# Patient Record
Sex: FEMALE | Race: WHITE | Employment: UNEMPLOYED | URBAN - METROPOLITAN AREA
[De-identification: names, ages, dates, MRNs, and addresses within clinical notes are randomized per-mention and may not be internally consistent; named-entity substitution may affect disease eponyms.]

---

## 2018-01-18 ENCOUNTER — HOSPITAL ENCOUNTER (OUTPATIENT)
Dept: GENERAL RADIOLOGY | Facility: CLINIC | Age: 48
Discharge: HOME | End: 2018-01-18
Attending: INTERNAL MEDICINE | Admitting: INTERNAL MEDICINE
Payer: COMMERCIAL

## 2018-01-18 DIAGNOSIS — R09.A2 GLOBUS SENSATION: ICD-10-CM

## 2018-01-18 PROCEDURE — 25500045 ZZH RX 255: Performed by: INTERNAL MEDICINE

## 2018-01-18 PROCEDURE — 74220 X-RAY XM ESOPHAGUS 1CNTRST: CPT

## 2018-01-18 RX ADMIN — ANTACID/ANTIFLATULENT 4 G: 380; 550; 10; 10 GRANULE, EFFERVESCENT ORAL at 10:46

## 2018-11-12 ENCOUNTER — APPOINTMENT (OUTPATIENT)
Dept: GENERAL RADIOLOGY | Facility: CLINIC | Age: 48
End: 2018-11-12
Attending: NURSE PRACTITIONER
Payer: COMMERCIAL

## 2018-11-12 ENCOUNTER — HOSPITAL ENCOUNTER (EMERGENCY)
Facility: CLINIC | Age: 48
Discharge: HOME | End: 2018-11-12
Attending: NURSE PRACTITIONER | Admitting: NURSE PRACTITIONER
Payer: COMMERCIAL

## 2018-11-12 VITALS
WEIGHT: 190 LBS | DIASTOLIC BLOOD PRESSURE: 86 MMHG | TEMPERATURE: 97.5 F | SYSTOLIC BLOOD PRESSURE: 125 MMHG | OXYGEN SATURATION: 93 % | HEART RATE: 98 BPM | RESPIRATION RATE: 18 BRPM

## 2018-11-12 DIAGNOSIS — R23.2 HOT FLASHES: ICD-10-CM

## 2018-11-12 LAB
ALBUMIN SERPL-MCNC: 4.2 G/DL (ref 3.4–5)
ALBUMIN UR-MCNC: NEGATIVE MG/DL
ALP SERPL-CCNC: 35 U/L (ref 40–150)
ALT SERPL W P-5'-P-CCNC: 28 U/L (ref 0–50)
ANION GAP SERPL CALCULATED.3IONS-SCNC: 8 MMOL/L (ref 3–14)
APPEARANCE UR: CLEAR
AST SERPL W P-5'-P-CCNC: 24 U/L (ref 0–45)
BASOPHILS # BLD AUTO: 0 10E9/L (ref 0–0.2)
BASOPHILS NFR BLD AUTO: 0.6 %
BILIRUB SERPL-MCNC: 0.4 MG/DL (ref 0.2–1.3)
BILIRUB UR QL STRIP: NEGATIVE
BUN SERPL-MCNC: 21 MG/DL (ref 7–30)
CALCIUM SERPL-MCNC: 9.4 MG/DL (ref 8.5–10.1)
CHLORIDE SERPL-SCNC: 104 MMOL/L (ref 94–109)
CO2 SERPL-SCNC: 26 MMOL/L (ref 20–32)
COLOR UR AUTO: YELLOW
CREAT SERPL-MCNC: 0.73 MG/DL (ref 0.52–1.04)
DIFFERENTIAL METHOD BLD: NORMAL
EOSINOPHIL # BLD AUTO: 0 10E9/L (ref 0–0.7)
EOSINOPHIL NFR BLD AUTO: 0.3 %
ERYTHROCYTE [DISTWIDTH] IN BLOOD BY AUTOMATED COUNT: 13.2 % (ref 10–15)
GFR SERPL CREATININE-BSD FRML MDRD: 84 ML/MIN/1.7M2
GLUCOSE SERPL-MCNC: 148 MG/DL (ref 70–99)
GLUCOSE UR STRIP-MCNC: NEGATIVE MG/DL
HCT VFR BLD AUTO: 40 % (ref 35–47)
HGB BLD-MCNC: 13.3 G/DL (ref 11.7–15.7)
HGB UR QL STRIP: NEGATIVE
IMM GRANULOCYTES # BLD: 0 10E9/L (ref 0–0.4)
IMM GRANULOCYTES NFR BLD: 0.3 %
KETONES UR STRIP-MCNC: 5 MG/DL
LEUKOCYTE ESTERASE UR QL STRIP: NEGATIVE
LIPASE SERPL-CCNC: 175 U/L (ref 73–393)
LYMPHOCYTES # BLD AUTO: 1.4 10E9/L (ref 0.8–5.3)
LYMPHOCYTES NFR BLD AUTO: 19.9 %
MCH RBC QN AUTO: 30.6 PG (ref 26.5–33)
MCHC RBC AUTO-ENTMCNC: 33.3 G/DL (ref 31.5–36.5)
MCV RBC AUTO: 92 FL (ref 78–100)
MONOCYTES # BLD AUTO: 0.3 10E9/L (ref 0–1.3)
MONOCYTES NFR BLD AUTO: 4.1 %
MUCOUS THREADS #/AREA URNS LPF: PRESENT /LPF
NEUTROPHILS # BLD AUTO: 5.2 10E9/L (ref 1.6–8.3)
NEUTROPHILS NFR BLD AUTO: 74.8 %
NITRATE UR QL: NEGATIVE
NRBC # BLD AUTO: 0 10*3/UL
NRBC BLD AUTO-RTO: 0 /100
PH UR STRIP: 5 PH (ref 5–7)
PLATELET # BLD AUTO: 222 10E9/L (ref 150–450)
POTASSIUM SERPL-SCNC: 4.2 MMOL/L (ref 3.4–5.3)
PROT SERPL-MCNC: 7.4 G/DL (ref 6.8–8.8)
RBC # BLD AUTO: 4.34 10E12/L (ref 3.8–5.2)
RBC #/AREA URNS AUTO: 0 /HPF (ref 0–2)
SODIUM SERPL-SCNC: 138 MMOL/L (ref 133–144)
SOURCE: ABNORMAL
SP GR UR STRIP: 1.02 (ref 1–1.03)
TROPONIN I SERPL-MCNC: <0.015 UG/L (ref 0–0.04)
UROBILINOGEN UR STRIP-MCNC: 0 MG/DL (ref 0–2)
WBC # BLD AUTO: 6.9 10E9/L (ref 4–11)
WBC #/AREA URNS AUTO: <1 /HPF (ref 0–5)

## 2018-11-12 PROCEDURE — 99285 EMERGENCY DEPT VISIT HI MDM: CPT | Mod: 25

## 2018-11-12 PROCEDURE — 83690 ASSAY OF LIPASE: CPT | Performed by: NURSE PRACTITIONER

## 2018-11-12 PROCEDURE — 84484 ASSAY OF TROPONIN QUANT: CPT | Performed by: NURSE PRACTITIONER

## 2018-11-12 PROCEDURE — 80053 COMPREHEN METABOLIC PANEL: CPT | Performed by: NURSE PRACTITIONER

## 2018-11-12 PROCEDURE — 25000128 H RX IP 250 OP 636: Performed by: NURSE PRACTITIONER

## 2018-11-12 PROCEDURE — 93005 ELECTROCARDIOGRAM TRACING: CPT

## 2018-11-12 PROCEDURE — 25000125 ZZHC RX 250: Performed by: NURSE PRACTITIONER

## 2018-11-12 PROCEDURE — 25000132 ZZH RX MED GY IP 250 OP 250 PS 637: Performed by: NURSE PRACTITIONER

## 2018-11-12 PROCEDURE — 71046 X-RAY EXAM CHEST 2 VIEWS: CPT

## 2018-11-12 PROCEDURE — 96374 THER/PROPH/DIAG INJ IV PUSH: CPT

## 2018-11-12 PROCEDURE — 85025 COMPLETE CBC W/AUTO DIFF WBC: CPT | Performed by: NURSE PRACTITIONER

## 2018-11-12 PROCEDURE — 81001 URINALYSIS AUTO W/SCOPE: CPT | Performed by: NURSE PRACTITIONER

## 2018-11-12 RX ORDER — ONDANSETRON 2 MG/ML
4 INJECTION INTRAMUSCULAR; INTRAVENOUS EVERY 30 MIN PRN
Status: DISCONTINUED | OUTPATIENT
Start: 2018-11-12 | End: 2018-11-12 | Stop reason: HOSPADM

## 2018-11-12 RX ADMIN — SODIUM CHLORIDE 1000 ML: 9 INJECTION, SOLUTION INTRAVENOUS at 16:46

## 2018-11-12 RX ADMIN — ONDANSETRON 4 MG: 2 INJECTION INTRAMUSCULAR; INTRAVENOUS at 16:46

## 2018-11-12 RX ADMIN — LIDOCAINE HYDROCHLORIDE 30 ML: 20 SOLUTION ORAL; TOPICAL at 16:48

## 2018-11-12 ASSESSMENT — ENCOUNTER SYMPTOMS
SHORTNESS OF BREATH: 0
NERVOUS/ANXIOUS: 1
NAUSEA: 1
PALPITATIONS: 1
LIGHT-HEADEDNESS: 1
FEVER: 1

## 2018-11-12 NOTE — ED TRIAGE NOTES
48-year-old female presents to the ER with complaints of extreme hot flashes. Was recently dx with DM and had a blood sugar of 160. Pt states she isn't wanting to go on insulin. Appears anxious and had an episode at triage when she hyperventilated. Pt denies anxiety and panic attacks.

## 2018-11-12 NOTE — ED AVS SNAPSHOT
[facility]    [address]    [facility] [address]    Phone:  [phone]    Fax:  [phone]                                       Cely Gastelum   MRN: [id number]    Department:  [facility]   Date of Visit:  11/12/2018           After Visit Summary Signature Page     I have received my discharge instructions, and my questions have been answered. I have discussed any challenges I see with this plan with the nurse or doctor.    ..........................................................................................................................................  Patient/Patient Representative Signature      ..........................................................................................................................................  Patient Representative Print Name and Relationship to Patient    ..................................................               ................................................  Date                                   Time    ..........................................................................................................................................  Reviewed by Signature/Title    ...................................................              ..............................................  Date                                               Time          22EPIC Rev 08/18

## 2018-11-12 NOTE — DISCHARGE INSTRUCTIONS
Call tomorrow to speak with hormonal speciatlist MD as well as primary care doctor.  Drink plenty of fluids.  Return to ED with any new and troublesome symptoms including worsening symptoms, chest pain, weakness, etc.

## 2018-11-12 NOTE — ED NOTES
"Pt refused medications upon ordering, stating that she \"wanted us to see what (she) was experiencing.\"  Discussed with patient that if episodes happened again, we would try the medications to see if they decreased the frequency of episodes.  Pt agreed to meds, reporting that she has been timing the episodes and they are happening approx every 30 minutes.    "

## 2018-11-12 NOTE — ED AVS SNAPSHOT
[facility] Department    [address]    [facility] [address]    Phone:  [phone]    Fax:  [phone]                                       Cely Gastelum   MRN: [id number]    Department:  [facility] Emergency Department   Date of Visit:  11/12/2018           Patient Information     Date Of Birth          1970        Your diagnoses for this visit were:     Hot flashes        You were seen by Jazlyn Malagon, PATRICE CNP.      Follow-up Information     Follow up with Clinic, [facility] In 1 day.    Contact information:    [address] [facility] [address] [phone]          Follow up with [facility] Emergency [facility].    Specialty:  EMERGENCY MEDICINE    Why:  As needed, If symptoms worsen    Contact information:    [address]  [facility] [address]  [phone]        Discharge Instructions       Call tomorrow to speak with hormonal speciatlist MD as well as primary care doctor.  Drink plenty of fluids.  Return to ED with any new and troublesome symptoms including worsening symptoms, chest pain, weakness, etc.    24 Hour Appointment Hotline       To make an appointment at any [facility] clinic, call [phone] ([phone]). If you don't have a family doctor or clinic, we will help you find one. [facility] clinics are conveniently located to serve the needs of you and your family.             Review of your medicines      Notice     You have not been prescribed any medications.            Procedures and tests performed during your visit     CBC with platelets differential    Comprehensive metabolic panel    EKG 12-lead, tracing only    Lipase    Troponin I    UA with Microscopic    XR Chest 2 Views      Orders Needing Specimen Collection     None      Pending Results     Date and Time Order Name Status Description    11/12/2018 1459 EKG 12-lead, tracing only Preliminary             Pending Culture  Results     No orders found from 11/10/2018 to 11/13/2018.            Pending Results Instructions     If you had any lab results that were not finalized at the time of your Discharge, you can call the ED Lab Result RN at [phone]. You will be contacted by this team for any positive Lab results or changes in treatment. The nurses are available 7 days a week from 10A to 6:30P.  You can leave a message 24 hours per day and they will return your call.        Test Results From Your Hospital Stay        11/12/2018  3:39 PM      Component Results     Component Value Ref Range & Units Status    Troponin I ES <0.015 0.000 - 0.045 ug/L Final    The 99th percentile for upper reference range is 0.045 ug/L.  Troponin values   in the range of 0.045 - 0.120 ug/L may be associated with risks of adverse   clinical events.           11/12/2018  3:19 PM      Component Results     Component Value Ref Range & Units Status    WBC 6.9 4.0 - 11.0 10e9/L Final    RBC Count 4.34 3.8 - 5.2 10e12/L Final    Hemoglobin 13.3 11.7 - 15.7 g/dL Final    Hematocrit 40.0 35.0 - 47.0 % Final    MCV 92 78 - 100 fl Final    MCH 30.6 26.5 - 33.0 pg Final    MCHC 33.3 31.5 - 36.5 g/dL Final    RDW 13.2 10.0 - 15.0 % Final    Platelet Count 222 150 - 450 10e9/L Final    Diff Method Automated Method  Final    % Neutrophils 74.8 % Final    % Lymphocytes 19.9 % Final    % Monocytes 4.1 % Final    % Eosinophils 0.3 % Final    % Basophils 0.6 % Final    % Immature Granulocytes 0.3 % Final    Nucleated RBCs 0 0 /100 Final    Absolute Neutrophil 5.2 1.6 - 8.3 10e9/L Final    Absolute Lymphocytes 1.4 0.8 - 5.3 10e9/L Final    Absolute Monocytes 0.3 0.0 - 1.3 10e9/L Final    Absolute Eosinophils 0.0 0.0 - 0.7 10e9/L Final    Absolute Basophils 0.0 0.0 - 0.2 10e9/L Final    Abs Immature Granulocytes 0.0 0 - 0.4 10e9/L Final    Absolute Nucleated RBC 0.0  Final         11/12/2018  3:39 PM      Component Results     Component Value Ref Range & Units Status     Sodium 138 133 - 144 mmol/L Final    Potassium 4.2 3.4 - 5.3 mmol/L Final    Chloride 104 94 - 109 mmol/L Final    Carbon Dioxide 26 20 - 32 mmol/L Final    Anion Gap 8 3 - 14 mmol/L Final    Glucose 148 (H) 70 - 99 mg/dL Final    Urea Nitrogen 21 7 - 30 mg/dL Final    Creatinine 0.73 0.52 - 1.04 mg/dL Final    GFR Estimate 84 >60 mL/min/1.7m2 Final    Non  GFR Calc    GFR Estimate If Black >90 >60 mL/min/1.7m2 Final    African American GFR Calc    Calcium 9.4 8.5 - 10.1 mg/dL Final    Bilirubin Total 0.4 0.2 - 1.3 mg/dL Final    Albumin 4.2 3.4 - 5.0 g/dL Final    Protein Total 7.4 6.8 - 8.8 g/dL Final    Alkaline Phosphatase 35 (L) 40 - 150 U/L Final    ALT 28 0 - 50 U/L Final    AST 24 0 - 45 U/L Final         11/12/2018  3:39 PM      Component Results     Component Value Ref Range & Units Status    Lipase 175 73 - 393 U/L Final         11/12/2018  4:49 PM      Narrative     XR CHEST 2 VW 11/12/2018 4:42 PM     HISTORY: chest pain;     COMPARISON: None        Impression     IMPRESSION: The cardiac silhouette and pulmonary vasculature are  within normal limits. There is an ASD closure device in the heart. No  evidence of pneumothorax or pleural effusion. The lungs are clear.    BIJAN FITZPATRICK MD         11/12/2018  5:14 PM      Component Results     Component Value Ref Range & Units Status    Color Urine Yellow  Final    Appearance Urine Clear  Final    Glucose Urine Negative NEG^Negative mg/dL Final    Bilirubin Urine Negative NEG^Negative Final    Ketones Urine 5 (A) NEG^Negative mg/dL Final    Specific Gravity Urine 1.020 1.003 - 1.035 Final    Blood Urine Negative NEG^Negative Final    pH Urine 5.0 5.0 - 7.0 pH Final    Protein Albumin Urine Negative NEG^Negative mg/dL Final    Urobilinogen mg/dL 0.0 0.0 - 2.0 mg/dL Final    Nitrite Urine Negative NEG^Negative Final    Leukocyte Esterase Urine Negative NEG^Negative Final    Source Midstream Urine  Final    WBC Urine <1 0 - 5 /HPF Final    RBC  Urine 0 0 - 2 /HPF Final    Mucous Urine Present (A) NEG^Negative /LPF Final                Clinical Quality Measure: Blood Pressure Screening     Your blood pressure was checked while you were in the emergency department today. The last reading we obtained was  BP: (!) 169/102 . Please read the guidelines below about what these numbers mean and what you should do about them.  If your systolic blood pressure (the top number) is less than 120 and your diastolic blood pressure (the bottom number) is less than 80, then your blood pressure is normal. There is nothing more that you need to do about it.  If your systolic blood pressure (the top number) is 120-139 or your diastolic blood pressure (the bottom number) is 80-89, your blood pressure may be higher than it should be. You should have your blood pressure rechecked within a year by a primary care provider.  If your systolic blood pressure (the top number) is 140 or greater or your diastolic blood pressure (the bottom number) is 90 or greater, you may have high blood pressure. High blood pressure is treatable, but if left untreated over time it can put you at risk for heart attack, stroke, or kidney failure. You should have your blood pressure rechecked by a primary care provider within the next 4 weeks.  If your provider in the emergency department today gave you specific instructions to follow-up with your doctor or provider even sooner than that, you should follow that instruction and not wait for up to 4 weeks for your follow-up visit.        Thank you for choosing [facility]       Thank you for choosing [facility] for your care. Our goal is always to provide you with excellent care. Hearing back from our patients is one way we can continue to improve our services. Please take a few minutes to complete the written survey that you may receive in the mail after you visit with us. Thank you!        [facility] Information     [employer] lets you send messages to your doctor,  "view your test results, renew your prescriptions, schedule appointments and more. To sign up, go to [email or web address] . Click on \"Log in\" on the left side of the screen, which will take you to the Welcome page. Then click on \"Sign up Now\" on the right side of the page.     You will be asked to enter the access code listed below, as well as some personal information. Please follow the directions to create your username and password.     Your access code is: [email or web address]  Expires: 2/10/2019  5:37 PM     Your access code will  in 90 days. If you need help or a new code, please call your [facility] clinic or [phone].        Care EveryWhere ID     This is your Care EveryWhere ID. This could be used by other organizations to access your [facility] medical records  [id number]        Equal Access to Services     [facility] Center[facility] : Hadmeri Baker, wacandelaria gonzalez, qaerwin taveraaljackie white, marco baldwin . So [facility] [phone].    ATENCIÓN: Si habla español, tiene a dennis disposición servicios gratuitos de asistencia lingüística. Llame al [phone].    We comply with applicable federal civil rights laws and [place] laws. We do not discriminate on the basis of race, color, national origin, age, disability, sex, sexual orientation, or gender identity.            After Visit Summary       This is your record. Keep this with you and show to your community pharmacist(s) and doctor(s) at your next visit.                  "

## 2018-11-12 NOTE — ED PROVIDER NOTES
"  History     Chief Complaint:  Anxiety    HPI   Cely Gastelum is a 48 year old female with a history of diabetes who presents to the emergency department for evaluation of anxiety and \"extreme hot flashes.\" This morning, she woke up feeling better than normal, but when she went to eat her breakfast of 1 egg and 1 piece of toast, she was overcome by nausea and heat, and had to spit out her food. Since that initial episode, she reports having extreme hot flashes that feel like a \"vengeful fever\" every 30-45 minutes. These episodes are often followed by seconds of confusion or memory loss. They are only accompanied by nausea when she tries to eat something, however she does report that she was able to keep a banana down. On the way to the ED, she reports that she forgot where she was going, however she denies being in the middle of one of the hot flashes at that time. Her most recent episode was while she was in the waiting room in the ED. Here, she denies any chest pain or shortness of breath. She does report lightheadedness and subjective palpitations. She reports a history of CVAs and a heart surgery but states with CVAs she experienced double vision and after ASD was closed, she has had no further CVAs. She denies being on blood thinners, even thought she is supposed to take an aspirin daily.  She has recently been undergoing a workup with a hormone specialist who did recently start her on new vitamins as well as other supplementations.  She is perimenopausal.    Allergies:  NSAIDs  Penicillin    Medications:    The patient is currently on no regular medications.    Past Medical History:    CVAs x3  Diabetes    Past Surgical History:    Heart surgery    Family History:    No past pertinent family history.    Social History:  Marital Status:   [2]  Presents alone. Drove self.      Review of Systems   Constitutional: Positive for fever (subjective).   Respiratory: Negative for shortness of breath.  "   Cardiovascular: Positive for palpitations. Negative for chest pain.   Gastrointestinal: Positive for nausea.   Neurological: Positive for light-headedness.   Psychiatric/Behavioral: The patient is nervous/anxious.      Physical Exam     Patient Vitals for the past 24 hrs:   BP Temp Temp src Pulse Heart Rate Resp SpO2 Weight   11/12/18 1659 (!) 169/102 - - - - - 97 % -   11/12/18 1630 (!) 146/102 - - - 87 - 98 % -   11/12/18 1615 (!) 148/99 - - - 85 - 95 % -   11/12/18 1600 (!) 144/107 - - - 84 - 91 % -   11/12/18 1545 (!) 148/97 - - - 82 - - -   11/12/18 1530 (!) 129/93 - - - - - 93 % -   11/12/18 1515 (!) 132/98 - - - - - 92 % -   11/12/18 1500 (!) 156/107 - - - - - 93 % -   11/12/18 1445 (!) 136/98 - - - - - 94 % -   11/12/18 1427 (!) 144/113 97.5  F (36.4  C) Temporal 98 - 18 98 % 86.2 kg (190 lb)     Physical Exam  Constitutional: Alert, attentive, GCS 15, in no apparent distress.    HENT: Mucous membranes are moist.   Eyes: EOM are normal. PERRLA. Conjunctiva pink, no scleral icterus or conjunctival injection  CV: regular rate and rhythm; no murmurs, rubs or gallups.  Radial, dorsalis pedis and posterior tibial pulses 2+ bilaterally.  Cap refill <2 seconds.  Respiratory: Effort normal. Lungs clear to auscultation bilaterally. No crackles/rubs/wheezes.  Good air movement.  GI:  Mild epigastric tenderness; no rebound or guarding. No distension. Normal bowel sounds.  MSK: Normal range of motion. No peripheral edema or calf tenderness.  Neurological: Alert and oriented to person/place/time.  Intact recent and remote memory, judgment and insight, normal mood and affect.  No aphasia/facial droop/dysarthria, tongue midline, symmetric palatal elevation, normal strength at SCM/trapezius/BUE/BLE, normal coordination to FNF at BUE and heel-to-shin at BLE, normal casual gait,  no pronator drift, sensation intact to LT over face/BUE/BLE  Skin: Skin is warm and dry.  No rashes or petechiae.  Psychiatric:  anxious.    Emergency Department Course   ECG:  Indication: Anxiety  Time: 1508  Vent. Rate 79 bpm. MA interval 152. QRS duration 90. QT/QTc 418/479. P-R-T axis 43 -14 20.  Normal sinus rhythm. Normal ECG. Read time: 1512 by Dr. Anthony.     Imaging:  Radiographic findings were communicated with the patient who voiced understanding of the findings.    XR Chest 2 views:   The cardiac silhouette and pulmonary vasculature are  within normal limits. There is an ASD closure device in the heart. No  evidence of pneumothorax or pleural effusion. The lungs are clear, As per radiology.     Laboratory:  CBC: WBC: 6.9, HGB: 13.3, PLT: 222    CMP: Glucose 148 (H), Alkphos: 35 (L), o/w WNL (Creatinine: 0.73)    Lipase: 175    1509 Troponin: <0.015    UA with Microscopic: Ketone: 5 (A), Mucous: Present (A), o/w WNL    Interventions:  1646 Zofran, 4 mg, IV injection   NS 1L IV  1648 GI Cocktail (Maalox/Mylanta and viscous Lidocaine), 30 mL suspension, PO     Emergency Department Course:  Nursing notes and vitals reviewed. (2130) I performed an exam of the patient as documented above.      IV inserted. Medicine administered as documented above. Blood drawn. This was sent to the lab for further testing, results above.     The patient was sent for a chest x-ray while in the emergency department, findings above.      EKG obtained in the ED, see results above.     The patient provided a urine sample here in the emergency department. This was sent for laboratory testing, findings above.     (7829) I rechecked the patient and discussed the results of her workup thus far.      Findings and plan explained to the Patient. Patient discharged home with instructions regarding supportive care, medications, and reasons to return. The importance of close follow-up was reviewed. The patient was prescribed no medications.      I personally reviewed the laboratory and imaging results with the Patient and answered all related questions prior to  discharge.     Impression & Plan      Medical Decision Making:  Cely Gastelum is a 48 year old female who presents for evaluation of hot flashes as detailed above.  A broad differential was considered.  There is no evidence of ACS or dangerous arrhythmia.  There is no evidence of intra-abdominal catastrophe.  Blood work is reassuring.  Chest x-ray was reviewed without any acute findings.  She has a completely nonfocal neurologic exam and I doubt central cause of symptoms therefore no MRI ordered from the ED.  Symptoms could be related to recent medication changes as well as menopause. She was encouraged to follow-up with PCP and specialist for further management tomorrow.  All questions answered prior to discharge.  Strict return precautions to ED with any changing symptoms.    Diagnosis:    ICD-10-CM    1. Hot flashes R23.2        Disposition:  discharged to home    Scribe Disclosure:  I, Letty Salgado, am serving as a scribe on 11/12/2018 at 2:46 PM to personally document services performed by Jazlyn Malagon APRN, CNP based on my observations and the provider's statements to me.     Letty Salgado  11/12/2018   [facility] EMERGENCY DEPARTMENT       Jazlyn Malagon APRN CNP  11/12/18 0277

## 2018-11-13 LAB — INTERPRETATION ECG - MUSE: NORMAL

## 2020-11-30 ENCOUNTER — APPOINTMENT (OUTPATIENT)
Dept: ULTRASOUND IMAGING | Facility: CLINIC | Age: 50
End: 2020-11-30
Attending: EMERGENCY MEDICINE
Payer: COMMERCIAL

## 2020-11-30 ENCOUNTER — APPOINTMENT (OUTPATIENT)
Dept: GENERAL RADIOLOGY | Facility: CLINIC | Age: 50
End: 2020-11-30
Attending: EMERGENCY MEDICINE
Payer: COMMERCIAL

## 2020-11-30 ENCOUNTER — HOSPITAL ENCOUNTER (EMERGENCY)
Facility: CLINIC | Age: 50
Discharge: HOME | End: 2020-11-30
Attending: EMERGENCY MEDICINE | Admitting: EMERGENCY MEDICINE
Payer: COMMERCIAL

## 2020-11-30 VITALS
BODY MASS INDEX: 31.89 KG/M2 | RESPIRATION RATE: 26 BRPM | TEMPERATURE: 97.9 F | DIASTOLIC BLOOD PRESSURE: 109 MMHG | WEIGHT: 180 LBS | SYSTOLIC BLOOD PRESSURE: 160 MMHG | OXYGEN SATURATION: 95 % | HEIGHT: 63 IN | HEART RATE: 85 BPM

## 2020-11-30 DIAGNOSIS — R03.0 ELEVATED BP WITHOUT DIAGNOSIS OF HYPERTENSION: ICD-10-CM

## 2020-11-30 DIAGNOSIS — R06.02 SHORTNESS OF BREATH: ICD-10-CM

## 2020-11-30 LAB
ANION GAP SERPL CALCULATED.3IONS-SCNC: 1 MMOL/L (ref 3–14)
BASOPHILS # BLD AUTO: 0.1 10E9/L (ref 0–0.2)
BASOPHILS NFR BLD AUTO: 0.7 %
BUN SERPL-MCNC: 24 MG/DL (ref 7–30)
CALCIUM SERPL-MCNC: 9.4 MG/DL (ref 8.5–10.1)
CHLORIDE SERPL-SCNC: 106 MMOL/L (ref 94–109)
CO2 SERPL-SCNC: 30 MMOL/L (ref 20–32)
CREAT SERPL-MCNC: 0.91 MG/DL (ref 0.52–1.04)
D DIMER PPP FEU-MCNC: 0.5 UG/ML FEU (ref 0–0.5)
DIFFERENTIAL METHOD BLD: NORMAL
EOSINOPHIL # BLD AUTO: 0.3 10E9/L (ref 0–0.7)
EOSINOPHIL NFR BLD AUTO: 3.2 %
ERYTHROCYTE [DISTWIDTH] IN BLOOD BY AUTOMATED COUNT: 11.8 % (ref 10–15)
GFR SERPL CREATININE-BSD FRML MDRD: 73 ML/MIN/{1.73_M2}
GLUCOSE SERPL-MCNC: 113 MG/DL (ref 70–99)
HCT VFR BLD AUTO: 40.4 % (ref 35–47)
HGB BLD-MCNC: 13.4 G/DL (ref 11.7–15.7)
IMM GRANULOCYTES # BLD: 0 10E9/L (ref 0–0.4)
IMM GRANULOCYTES NFR BLD: 0.4 %
LYMPHOCYTES # BLD AUTO: 2.4 10E9/L (ref 0.8–5.3)
LYMPHOCYTES NFR BLD AUTO: 28.6 %
MCH RBC QN AUTO: 32.1 PG (ref 26.5–33)
MCHC RBC AUTO-ENTMCNC: 33.2 G/DL (ref 31.5–36.5)
MCV RBC AUTO: 97 FL (ref 78–100)
MONOCYTES # BLD AUTO: 0.5 10E9/L (ref 0–1.3)
MONOCYTES NFR BLD AUTO: 5.3 %
NEUTROPHILS # BLD AUTO: 5.2 10E9/L (ref 1.6–8.3)
NEUTROPHILS NFR BLD AUTO: 61.8 %
NRBC # BLD AUTO: 0 10*3/UL
NRBC BLD AUTO-RTO: 0 /100
NT-PROBNP SERPL-MCNC: 96 PG/ML (ref 0–900)
PLATELET # BLD AUTO: 233 10E9/L (ref 150–450)
POTASSIUM SERPL-SCNC: 4.3 MMOL/L (ref 3.4–5.3)
RBC # BLD AUTO: 4.17 10E12/L (ref 3.8–5.2)
SODIUM SERPL-SCNC: 137 MMOL/L (ref 133–144)
TROPONIN I SERPL-MCNC: <0.015 UG/L (ref 0–0.04)
WBC # BLD AUTO: 8.4 10E9/L (ref 4–11)

## 2020-11-30 PROCEDURE — 93970 EXTREMITY STUDY: CPT

## 2020-11-30 PROCEDURE — 83880 ASSAY OF NATRIURETIC PEPTIDE: CPT | Performed by: EMERGENCY MEDICINE

## 2020-11-30 PROCEDURE — 99285 EMERGENCY DEPT VISIT HI MDM: CPT | Mod: 25

## 2020-11-30 PROCEDURE — 85379 FIBRIN DEGRADATION QUANT: CPT | Performed by: EMERGENCY MEDICINE

## 2020-11-30 PROCEDURE — 99284 EMERGENCY DEPT VISIT MOD MDM: CPT | Mod: 25

## 2020-11-30 PROCEDURE — 84484 ASSAY OF TROPONIN QUANT: CPT | Performed by: EMERGENCY MEDICINE

## 2020-11-30 PROCEDURE — 71045 X-RAY EXAM CHEST 1 VIEW: CPT

## 2020-11-30 PROCEDURE — 80048 BASIC METABOLIC PNL TOTAL CA: CPT | Performed by: EMERGENCY MEDICINE

## 2020-11-30 PROCEDURE — 93005 ELECTROCARDIOGRAM TRACING: CPT

## 2020-11-30 PROCEDURE — 85025 COMPLETE CBC W/AUTO DIFF WBC: CPT | Performed by: EMERGENCY MEDICINE

## 2020-11-30 RX ORDER — AMLODIPINE BESYLATE 5 MG/1
2.5 TABLET ORAL DAILY
Qty: 30 TABLET | Refills: 0 | Status: SHIPPED | OUTPATIENT
Start: 2020-11-30

## 2020-11-30 RX ORDER — ESTRADIOL 0.5 MG/1
TABLET ORAL
COMMUNITY
Start: 2020-08-23

## 2020-11-30 RX ORDER — ALBUTEROL SULFATE 90 UG/1
2 AEROSOL, METERED RESPIRATORY (INHALATION) EVERY 6 HOURS PRN
Qty: 1 INHALER | Refills: 0 | Status: SHIPPED | OUTPATIENT
Start: 2020-11-30

## 2020-11-30 ASSESSMENT — ENCOUNTER SYMPTOMS
SHORTNESS OF BREATH: 1
CHEST TIGHTNESS: 1
FEVER: 0
RHINORRHEA: 0
SORE THROAT: 0
CHILLS: 0
COUGH: 0
MYALGIAS: 0

## 2020-11-30 ASSESSMENT — MIFFLIN-ST. JEOR: SCORE: 1395.6

## 2020-11-30 NOTE — ED NOTES
Patient discharged home with discharge paperwork and printed prescriptions. Vital signs stable at discharge. Education provided regarding medication use, changing positions slowly, inhaler use, to follow up with PCP and when to return to ED. Pt verbalized understanding. IV removed. Catheter intact. All questions answered.

## 2020-11-30 NOTE — ED AVS SNAPSHOT
[facility] Emergency Dept  [address]  [facility] [address]  Phone: [phone]  Fax: [phone]                                    Cely Gastelum   MRN: [id number]    Department: [facility] Emergency Dept   Date of Visit: 11/30/2020           After Visit Summary Signature Page    I have received my discharge instructions, and my questions have been answered. I have discussed any challenges I see with this plan with the nurse or doctor.    ..........................................................................................................................................  Patient/Patient Representative Signature      ..........................................................................................................................................  Patient Representative Print Name and Relationship to Patient    ..................................................               ................................................  Date                                   Time    ..........................................................................................................................................  Reviewed by Signature/Title    ...................................................              ..............................................  Date                                               Time          22EPIC Rev 08/18

## 2020-11-30 NOTE — ED TRIAGE NOTES
Chest tightness/pressure since Saturday.  Shortness of breath and chest congestion/coughalso since then.  BP elevated at home.

## 2020-11-30 NOTE — ED PROVIDER NOTES
"  History     Chief Complaint:  Shortness of Breath      HPI   Cely Gastelum is a 50 year old female with history of type II diabetes, migraines, Cryptogenic Stroke, who presents for evaluation of shortness of breath. The patient reports that 2 nights ago she started to notice shortness of breath detailing that she is taking shallow breaths compared to her baseline and she can not get take a full breath in. The patient has mild chest tightness/pressure with the shortness of breath but denies any chest pain. She notes that it was intermittent in nature but worsened at night and while laying down.  The following day she states that she did not notice the shortness of breath with exertion but after watching their door cam video of taking out the trash, she thought she appeared short of breath performing this task. For the past several weeks she has also had \"major\" cramping her posterior leg that will wake her from sleep but has not had any leg swelling. The patient denies runny nose, sore throat, chills, fever, body aches, cough, or congestion. She smokes e-cigarettes and had been using this prior to symptom onset. There is no history of asthma/COPD. Of note, the patient reports loss of taste/smell 2 months ago and is declining COVID-19 testing today.      Cardiac/PE/DVT Risk Factors:  History of hypertension - Negative   History of hyperlipidemia - Negative   History of diabetes - Positive   History of smoking - Positive   Personal history of PE/DVT - Negative   Family history of PE/DVT - Negative   Family history of heart complications - Negative   Recent travel - Negative   Recent surgery - Negative   Other immobilizations - Negative   Cancer - Negative    Hormone Replacements - Positive     Allergies:  Nsaids  Penicillins    Medications:   No current medications.     Past Medical History:    Coarse tremors   Anxiety  Insomnia  Migraines   Type II diabetes  TIA type phenomenon  Cryptogenic Stroke   Stress " "incontinence   Premenstrual tension headaches   Patent foramen ovale     Past Surgical History:    Gastric bypass  Breast lift  Abdominoplasty   Tubal ligation  AGA amplatzer device implanted      Family History:    Father: diabetes  Mother: COPD  Brother: diabetes     Social History:  The patient was unaccompanied to the ED.  Smoking Status: Positive   Types: E-cig   Smokeless Tobacco: Never Used  Alcohol Use: Positive  Drug Use: Negative  PCP: Diana Barboza     Review of Systems   Constitutional: Negative for chills and fever.   HENT: Negative for congestion, rhinorrhea and sore throat.    Respiratory: Positive for chest tightness and shortness of breath. Negative for cough.    Cardiovascular: Negative for chest pain and leg swelling.   Musculoskeletal: Negative for myalgias.        Posterior leg cramping   All other systems reviewed and are negative.    Physical Exam     Patient Vitals for the past 24 hrs:   BP Temp Temp src Pulse Resp SpO2 Height Weight   11/30/20 1704 (!) 160/109 -- -- 85 -- 95 % -- --   11/30/20 1700 (!) 167/104 -- -- -- -- 96 % -- --   11/30/20 1600 -- -- -- 83 26 95 % -- --   11/30/20 1515 (!) 156/96 -- -- 83 -- 96 % -- --   11/30/20 1416 (!) 171/110 97.9  F (36.6  C) Oral 97 20 97 % 1.6 m (5' 3\") 81.6 kg (180 lb)       Physical Exam    Constitutional: Alert, attentive, GCS 15  HENT:    Nose: Nose normal.    Mouth/Throat: Oropharynx is clear, mucous membranes are moist  Eyes: EOM are normal, anicteric, conjugate gaze  CV: regular rate and rhythm; no murmurs  Chest: Effort normal and breath sounds clear without wheezing or rales, symmetric bilaterally   GI:  non tender. No distension. No guarding or rebound.    MSK: No LE edema, no tenderness to palpation of BLE.  Neurological: Alert, attentive, moving all extremities equally.   Skin: Skin is warm and dry.    Emergency Department Course     ECG:  ECG taken at 1504, ECG read at 1504  Sinus rhythm with occasional premature " ventricular complexes   Otherwise normal ECG  No significant change compared to EKG dated 11/12/2018  Rate 75 bpm. AL interval 148 ms. QRS duration 86 ms. QT/QTc 412/460 ms. P-R-T axes 55 -11 26.    Imaging:  Radiology findings were communicated with the patient who voiced understanding of the findings.    US Lower Extremity Venous Duplex Bilateral  Negative for deep venous thrombosis in both lower  extremities.  ANNE MARIE ROUSE MD  Reading per radiology    XR Chest Port 1 View  No acute cardiopulmonary disease.  Reading per radiology      Laboratory:  Laboratory findings were communicated with the patient who voiced understanding of the findings.    CBC: WBC 8.4, HGB 13.4,   BMP: Anion Gap 1 (L), Glucose 113 (H), o/w WNL (Creatinine 0.91)    BNP: 96    Troponin (Collected 1449): <0.015    D Dimer: 0.5    Emergency Department Course:    1449 IV was inserted and blood was drawn for laboratory testing, results above.     1450 Nursing notes and vitals reviewed. I performed an exam of the patient as documented above.     1520 Patient rechecked and updated.      1504 EKG obtained as noted above.     1601 Portable chest XR obtained in the ED, results above.     1618 The patient was sent for a US while in the emergency department, results above.     1725 Prior to discharge, I personally reviewed the results with the patient and all related questions were answered. The patient verbalized understanding and is amenable to plan.     Impression & Plan      Covid-19  Cely Gastelum was evaluated during a global COVID-19 pandemic, which necessitated consideration that the patient might be at risk for infection with the SARS-CoV-2 virus that causes COVID-19.   Applicable protocols for evaluation were followed during the patient's care.   COVID-19 was considered as part of the patient's evaluation. The plan for testing is:  Patient refused.     Medical Decision Making:  Cely Gastelum is a 50 year old female who presents to the  emergency department for evaluation of shortness of breath.  She primarily reports pressure and shortness of breath when lying supine at night but no exertional discomfort or shortness of breath.  Vital signs arrival notable for moderately elevated blood pressure however EKG shows no evidence of ischemia, troponin negative with low suspicion for ACS by history.  She does use e-cigarettes and takes estrogen pills making VTE more likely however her D-dimer is negative effectively ruling out PE and otherwise low risk patient.  She does not have any infectious symptoms, pericarditis seems unlikely, no friction rub on auscultation.  With negative troponin, no indication for echocardiogram with low concern for endocarditis. Do not suspect aortic pathology.  She did endorse some leg cramping this is primarily in the hamstrings however DVT ultrasound was negative.  Chest x-ray negative.  Patient refused COVID-19 testing stating she lost her smell suddenly several months ago and feels she has recovered from Covid.  Given negative work-up as above, I do feel patient is safe for discharge home.  We will have her give a trial of albuterol and recommend outpatient follow-up with PCP for stress echocardiogram.  Precautions were reviewed and she was discharged home. Will initiate her on norvasc for elevated BP.     Diagnosis:    ICD-10-CM    1. Shortness of breath  R06.02    2. Elevated BP without diagnosis of hypertension  R03.0      Disposition:   The patient is discharged to home.     Discharge Medications:  New Prescriptions    ALBUTEROL (PROAIR HFA/PROVENTIL HFA/VENTOLIN HFA) 108 (90 BASE) MCG/ACT INHALER    Inhale 2 puffs into the lungs every 6 hours as needed for shortness of breath / dyspnea or wheezing    AMLODIPINE (NORVASC) 5 MG TABLET    Take 0.5 tablets (2.5 mg) by mouth daily     Duane Yost MD  [employer]  7:28 PM 11/30/20     Scribe Disclosure:  I, Orla Severson, am serving as  a scribe at 2:29 PM on 11/30/2020 to document services personally performed by Duane Yost MD based on my observations and the provider's statements to me.     [facility] EMERGENCY DEPT         Daune Yost MD  11/30/20 1928

## 2020-11-30 NOTE — DISCHARGE INSTRUCTIONS
You should try the albuterol to see if this helps with the tightness in your chest.  I do recommend you start taking the Norvasc or follow closely with your primary doctor for recheck of your blood pressure given how elevated was here.  You should make an appointment for follow-up for shortness of breath however you should return here should you develop high fever, worsening shortness of breath or severe chest pain.

## 2020-12-01 LAB — INTERPRETATION ECG - MUSE: NORMAL

## 2021-12-02 ENCOUNTER — LAB REQUISITION (OUTPATIENT)
Dept: LAB | Facility: CLINIC | Age: 51
End: 2021-12-02

## 2021-12-02 LAB
ESTRADIOL SERPL-MCNC: 37 PG/ML
PROGEST SERPL-MCNC: 13.2 NG/ML
VIT B12 SERPL-MCNC: 613 PG/ML (ref 193–986)

## 2021-12-02 PROCEDURE — 84270 ASSAY OF SEX HORMONE GLOBUL: CPT | Performed by: FAMILY MEDICINE

## 2021-12-02 PROCEDURE — 82670 ASSAY OF TOTAL ESTRADIOL: CPT | Performed by: FAMILY MEDICINE

## 2021-12-02 PROCEDURE — 84144 ASSAY OF PROGESTERONE: CPT | Performed by: FAMILY MEDICINE

## 2021-12-02 PROCEDURE — 82679 ASSAY OF ESTRONE: CPT | Performed by: FAMILY MEDICINE

## 2021-12-02 PROCEDURE — 82627 DEHYDROEPIANDROSTERONE: CPT | Performed by: FAMILY MEDICINE

## 2021-12-02 PROCEDURE — 82306 VITAMIN D 25 HYDROXY: CPT | Performed by: FAMILY MEDICINE

## 2021-12-02 PROCEDURE — 84403 ASSAY OF TOTAL TESTOSTERONE: CPT | Performed by: FAMILY MEDICINE

## 2021-12-02 PROCEDURE — 82607 VITAMIN B-12: CPT | Performed by: FAMILY MEDICINE

## 2021-12-03 LAB
DEPRECATED CALCIDIOL+CALCIFEROL SERPL-MC: 78 UG/L (ref 20–75)
DHEA-S SERPL-MCNC: 437 UG/DL (ref 35–430)
SHBG SERPL-SCNC: 103 NMOL/L (ref 30–135)

## 2021-12-05 LAB
ESTRONE SERPL-MCNC: 194.7 PG/ML
TESTOST FREE SERPL-MCNC: 0.11 NG/DL
TESTOST SERPL-MCNC: 14 NG/DL (ref 8–60)

## 2022-10-06 ENCOUNTER — LAB REQUISITION (OUTPATIENT)
Dept: LAB | Facility: CLINIC | Age: 52
End: 2022-10-06

## 2022-10-06 LAB
DEPRECATED CALCIDIOL+CALCIFEROL SERPL-MC: 85 UG/L (ref 20–75)
ESTRADIOL SERPL-MCNC: 127 PG/ML
FERRITIN SERPL-MCNC: 28 NG/ML (ref 11–328)
IRON SATN MFR SERPL: 30 % (ref 15–46)
IRON SERPL-MCNC: 125 UG/DL (ref 35–180)
PROGEST SERPL-MCNC: 2.5 NG/ML
TIBC SERPL-MCNC: 412 UG/DL (ref 240–430)

## 2022-10-06 PROCEDURE — 84403 ASSAY OF TOTAL TESTOSTERONE: CPT | Performed by: FAMILY MEDICINE

## 2022-10-06 PROCEDURE — 82627 DEHYDROEPIANDROSTERONE: CPT | Performed by: FAMILY MEDICINE

## 2022-10-06 PROCEDURE — 82306 VITAMIN D 25 HYDROXY: CPT | Performed by: FAMILY MEDICINE

## 2022-10-06 PROCEDURE — 82679 ASSAY OF ESTRONE: CPT | Performed by: FAMILY MEDICINE

## 2022-10-06 PROCEDURE — 82728 ASSAY OF FERRITIN: CPT | Performed by: FAMILY MEDICINE

## 2022-10-06 PROCEDURE — 83550 IRON BINDING TEST: CPT | Performed by: FAMILY MEDICINE

## 2022-10-06 PROCEDURE — 84270 ASSAY OF SEX HORMONE GLOBUL: CPT | Performed by: FAMILY MEDICINE

## 2022-10-06 PROCEDURE — 82670 ASSAY OF TOTAL ESTRADIOL: CPT | Performed by: FAMILY MEDICINE

## 2022-10-06 PROCEDURE — 84144 ASSAY OF PROGESTERONE: CPT | Performed by: FAMILY MEDICINE

## 2022-10-07 LAB
DHEA-S SERPL-MCNC: 347 UG/DL (ref 35–430)
SHBG SERPL-SCNC: 39 NMOL/L (ref 30–135)

## 2022-10-10 LAB — ESTRONE SERPL-MCNC: 10.7 PG/ML

## 2022-10-11 LAB
TESTOST FREE SERPL-MCNC: 0.13 NG/DL
TESTOST SERPL-MCNC: 8 NG/DL (ref 8–60)